# Patient Record
Sex: FEMALE | Race: WHITE | NOT HISPANIC OR LATINO | Employment: FULL TIME | URBAN - METROPOLITAN AREA
[De-identification: names, ages, dates, MRNs, and addresses within clinical notes are randomized per-mention and may not be internally consistent; named-entity substitution may affect disease eponyms.]

---

## 2017-06-13 ENCOUNTER — TRANSCRIBE ORDERS (OUTPATIENT)
Dept: ADMINISTRATIVE | Facility: HOSPITAL | Age: 52
End: 2017-06-13

## 2017-06-13 DIAGNOSIS — Z12.31 VISIT FOR SCREENING MAMMOGRAM: Primary | ICD-10-CM

## 2017-06-22 ENCOUNTER — HOSPITAL ENCOUNTER (OUTPATIENT)
Dept: MAMMOGRAPHY | Facility: HOSPITAL | Age: 52
Discharge: HOME | End: 2017-06-22
Admitting: FAMILY MEDICINE

## 2017-06-22 DIAGNOSIS — Z12.31 VISIT FOR SCREENING MAMMOGRAM: ICD-10-CM

## 2017-06-22 PROCEDURE — 77063 BREAST TOMOSYNTHESIS BI: CPT

## 2017-06-22 PROCEDURE — G0202 SCR MAMMO BI INCL CAD: HCPCS

## 2018-06-14 ENCOUNTER — TRANSCRIBE ORDERS (OUTPATIENT)
Dept: ADMINISTRATIVE | Facility: HOSPITAL | Age: 53
End: 2018-06-14

## 2018-06-14 DIAGNOSIS — Z12.31 SCREENING MAMMOGRAM, ENCOUNTER FOR: Primary | ICD-10-CM

## 2018-06-25 ENCOUNTER — HOSPITAL ENCOUNTER (OUTPATIENT)
Dept: MAMMOGRAPHY | Facility: HOSPITAL | Age: 53
Discharge: HOME | End: 2018-06-25
Admitting: FAMILY MEDICINE

## 2018-06-25 DIAGNOSIS — Z12.31 SCREENING MAMMOGRAM, ENCOUNTER FOR: ICD-10-CM

## 2018-06-25 PROCEDURE — 77063 BREAST TOMOSYNTHESIS BI: CPT

## 2018-06-25 PROCEDURE — 77067 SCR MAMMO BI INCL CAD: CPT

## 2018-07-11 ENCOUNTER — TRANSCRIBE ORDERS (OUTPATIENT)
Dept: ADMINISTRATIVE | Facility: HOSPITAL | Age: 53
End: 2018-07-11

## 2018-07-11 DIAGNOSIS — N64.89 BREAST ASYMMETRY: Primary | ICD-10-CM

## 2018-07-16 ENCOUNTER — APPOINTMENT (OUTPATIENT)
Dept: MAMMOGRAPHY | Facility: HOSPITAL | Age: 53
End: 2018-07-16

## 2018-07-17 ENCOUNTER — HOSPITAL ENCOUNTER (OUTPATIENT)
Dept: MAMMOGRAPHY | Facility: HOSPITAL | Age: 53
Discharge: HOME | End: 2018-07-17
Admitting: FAMILY MEDICINE

## 2018-07-17 DIAGNOSIS — N64.89 BREAST ASYMMETRY: ICD-10-CM

## 2018-07-17 PROCEDURE — 77065 DX MAMMO INCL CAD UNI: CPT

## 2019-05-29 ENCOUNTER — TRANSCRIBE ORDERS (OUTPATIENT)
Dept: ADMINISTRATIVE | Facility: HOSPITAL | Age: 54
End: 2019-05-29

## 2019-05-29 DIAGNOSIS — Z12.39 BREAST CANCER SCREENING: Primary | ICD-10-CM

## 2019-06-26 ENCOUNTER — HOSPITAL ENCOUNTER (OUTPATIENT)
Dept: MAMMOGRAPHY | Facility: HOSPITAL | Age: 54
Discharge: HOME | End: 2019-06-26
Admitting: FAMILY MEDICINE

## 2019-06-26 DIAGNOSIS — Z12.39 BREAST CANCER SCREENING: ICD-10-CM

## 2019-06-26 PROCEDURE — 77063 BREAST TOMOSYNTHESIS BI: CPT

## 2019-06-26 PROCEDURE — 77067 SCR MAMMO BI INCL CAD: CPT

## 2020-06-04 ENCOUNTER — TRANSCRIBE ORDERS (OUTPATIENT)
Dept: ADMINISTRATIVE | Facility: HOSPITAL | Age: 55
End: 2020-06-04

## 2020-06-04 DIAGNOSIS — Z12.31 SCREENING MAMMOGRAM, ENCOUNTER FOR: Primary | ICD-10-CM

## 2020-07-14 ENCOUNTER — APPOINTMENT (OUTPATIENT)
Dept: MAMMOGRAPHY | Facility: HOSPITAL | Age: 55
End: 2020-07-14

## 2020-07-16 ENCOUNTER — HOSPITAL ENCOUNTER (OUTPATIENT)
Dept: MAMMOGRAPHY | Facility: HOSPITAL | Age: 55
Discharge: HOME | End: 2020-07-16
Admitting: FAMILY MEDICINE

## 2020-07-16 DIAGNOSIS — Z12.31 SCREENING MAMMOGRAM, ENCOUNTER FOR: ICD-10-CM

## 2020-07-16 PROCEDURE — 77063 BREAST TOMOSYNTHESIS BI: CPT

## 2020-07-16 PROCEDURE — 77067 SCR MAMMO BI INCL CAD: CPT

## 2020-10-22 ENCOUNTER — TRANSCRIBE ORDERS (OUTPATIENT)
Dept: ADMINISTRATIVE | Facility: HOSPITAL | Age: 55
End: 2020-10-22

## 2020-10-22 DIAGNOSIS — I70.213 ATHEROSCLEROSIS OF NATIVE ARTERY OF BOTH LOWER EXTREMITIES WITH INTERMITTENT CLAUDICATION (HCC): Primary | ICD-10-CM

## 2020-10-30 ENCOUNTER — HOSPITAL ENCOUNTER (OUTPATIENT)
Dept: CARDIOLOGY | Facility: HOSPITAL | Age: 55
Discharge: HOME | End: 2020-10-30
Admitting: FAMILY MEDICINE

## 2020-10-30 DIAGNOSIS — I70.213 ATHEROSCLEROSIS OF NATIVE ARTERY OF BOTH LOWER EXTREMITIES WITH INTERMITTENT CLAUDICATION (HCC): ICD-10-CM

## 2020-10-30 LAB
BH CV LOWER ARTERIAL LEFT ABI RATIO: 1.2
BH CV LOWER ARTERIAL LEFT DORSALIS PEDIS SYS MAX: 109 MMHG
BH CV LOWER ARTERIAL LEFT GREAT TOE SYS MAX: 89 MMHG
BH CV LOWER ARTERIAL LEFT POST TIBIAL SYS MAX: 105 MMHG
BH CV LOWER ARTERIAL LEFT TBI RATIO: 1
BH CV LOWER ARTERIAL RIGHT ABI RATIO: 1.3
BH CV LOWER ARTERIAL RIGHT DORSALIS PEDIS SYS MAX: 117 MMHG
BH CV LOWER ARTERIAL RIGHT GREAT TOE SYS MAX: 86 MMHG
BH CV LOWER ARTERIAL RIGHT POST TIBIAL SYS MAX: 111 MMHG
BH CV LOWER ARTERIAL RIGHT TBI RATIO: 0.97
UPPER ARTERIAL LEFT ARM BRACHIAL SYS MAX: 89 MMHG
UPPER ARTERIAL RIGHT ARM BRACHIAL SYS MAX: 80 MMHG

## 2020-10-30 PROCEDURE — 93922 UPR/L XTREMITY ART 2 LEVELS: CPT

## 2021-05-26 ENCOUNTER — TRANSCRIBE ORDERS (OUTPATIENT)
Dept: ADMINISTRATIVE | Facility: HOSPITAL | Age: 56
End: 2021-05-26

## 2021-05-26 DIAGNOSIS — Z12.39 BREAST SCREENING: Primary | ICD-10-CM

## 2021-07-19 ENCOUNTER — HOSPITAL ENCOUNTER (OUTPATIENT)
Dept: MAMMOGRAPHY | Facility: HOSPITAL | Age: 56
Discharge: HOME | End: 2021-07-19
Admitting: FAMILY MEDICINE

## 2021-07-19 DIAGNOSIS — Z12.39 BREAST SCREENING: ICD-10-CM

## 2021-07-19 PROCEDURE — 77067 SCR MAMMO BI INCL CAD: CPT

## 2021-07-19 PROCEDURE — 77063 BREAST TOMOSYNTHESIS BI: CPT

## 2022-06-20 ENCOUNTER — TRANSCRIBE ORDERS (OUTPATIENT)
Dept: ADMINISTRATIVE | Facility: HOSPITAL | Age: 57
End: 2022-06-20

## 2022-06-20 DIAGNOSIS — Z12.31 SCREENING MAMMOGRAM FOR BREAST CANCER: Primary | ICD-10-CM

## 2022-07-21 ENCOUNTER — HOSPITAL ENCOUNTER (OUTPATIENT)
Dept: MAMMOGRAPHY | Facility: HOSPITAL | Age: 57
Discharge: HOME | End: 2022-07-21
Admitting: FAMILY MEDICINE

## 2022-07-21 DIAGNOSIS — Z12.31 SCREENING MAMMOGRAM FOR BREAST CANCER: ICD-10-CM

## 2022-07-21 PROCEDURE — 77067 SCR MAMMO BI INCL CAD: CPT

## 2022-07-21 PROCEDURE — 77063 BREAST TOMOSYNTHESIS BI: CPT

## 2023-06-05 ENCOUNTER — OFFICE VISIT (OUTPATIENT)
Dept: FAMILY MEDICINE CLINIC | Facility: CLINIC | Age: 58
End: 2023-06-05
Payer: COMMERCIAL

## 2023-06-05 ENCOUNTER — HOSPITAL ENCOUNTER (OUTPATIENT)
Dept: GENERAL RADIOLOGY | Facility: HOSPITAL | Age: 58
Discharge: HOME | End: 2023-06-05
Admitting: FAMILY MEDICINE
Payer: COMMERCIAL

## 2023-06-05 ENCOUNTER — TRANSCRIBE ORDERS (OUTPATIENT)
Dept: ADMINISTRATIVE | Facility: HOSPITAL | Age: 58
End: 2023-06-05
Payer: COMMERCIAL

## 2023-06-05 VITALS
TEMPERATURE: 98.7 F | DIASTOLIC BLOOD PRESSURE: 72 MMHG | OXYGEN SATURATION: 99 % | HEIGHT: 61 IN | BODY MASS INDEX: 21.07 KG/M2 | SYSTOLIC BLOOD PRESSURE: 112 MMHG | WEIGHT: 111.6 LBS | HEART RATE: 65 BPM

## 2023-06-05 DIAGNOSIS — M79.661 PAIN OF RIGHT LOWER LEG: Primary | ICD-10-CM

## 2023-06-05 DIAGNOSIS — Z12.31 VISIT FOR SCREENING MAMMOGRAM: Primary | ICD-10-CM

## 2023-06-05 DIAGNOSIS — M79.661 PAIN OF RIGHT LOWER LEG: ICD-10-CM

## 2023-06-05 PROCEDURE — 73590 X-RAY EXAM OF LOWER LEG: CPT

## 2023-06-05 RX ORDER — DIPHENOXYLATE HYDROCHLORIDE AND ATROPINE SULFATE 2.5; .025 MG/1; MG/1
1 TABLET ORAL DAILY
COMMUNITY

## 2023-06-05 NOTE — PROGRESS NOTES
"Chief Complaint  Leg Pain (Calf pain, has had for years, but is getting worse to where she can't walk )    Subjective    History of Present Illness {CC  Problem List  Visit  Diagnosis   Encounters  Notes  Medications  Labs  Result Review Imaging  Media :23}     Charmaine Pierre presents to [facility] for Leg Pain (Calf pain, has had for years, but is getting worse to where she can't walk ).  History of Present Illness     She reports a history of intermittent right leg/calf pain for years. There was no known injury. The past month her symptoms have been more severe. It has impeeded her exercise and general activity. She was recently on vacation and was unable to walk due to pain. She previously played tennis 5 times a week and now is not able due to pain. It generally improves some with rest and naproxen. A few years ago she had an EMG and NCS that was normal at that time.     Objective     Vital Signs:   /72 (BP Location: Right arm, Patient Position: Sitting, Cuff Size: Small Adult)   Pulse 65   Temp 98.7 °F (37.1 °C) (Oral)   Ht 153.7 cm (60.5\")   Wt 50.6 kg (111 lb 9.6 oz)   SpO2 99%   BMI 21.44 kg/m²   Body mass index is 21.44 kg/m².     Physical Exam  Constitutional:       General: She is not in acute distress.     Appearance: Normal appearance.   Musculoskeletal:         General: No swelling (negative liam's sign).   Neurological:      General: No focal deficit present.        Result Review  Data Reviewed:{ Labs  Result Review  Imaging  Med Tab  Media :23}                Assessment and Plan {CC Problem List  Visit Diagnosis  ROS  Review (Popup)  Health Maintenance  Quality  BestPractice  Medications  SmartSets  SnapShot Encounters  Media :23}   Diagnoses and all orders for this visit:    1. Pain of right lower leg (Primary)  -     XR Tibia Fibula 2 View Right (In Office); Future        Patient Instructions   Check xray of right lower leg. "   Continue heat, stretching and massage.   If xray is normal, consider spine imaging/PT/ortho        Patient was given instructions and counseling regarding her condition or for health maintenance advice on the AVS.       No follow-ups on file.    Ilene Garcia MD

## 2023-06-06 DIAGNOSIS — M79.661 PAIN OF RIGHT LOWER LEG: Primary | ICD-10-CM

## 2023-07-27 ENCOUNTER — HOSPITAL ENCOUNTER (OUTPATIENT)
Dept: MAMMOGRAPHY | Facility: HOSPITAL | Age: 58
Discharge: HOME | End: 2023-07-27
Admitting: FAMILY MEDICINE
Payer: COMMERCIAL

## 2023-07-27 DIAGNOSIS — Z12.31 VISIT FOR SCREENING MAMMOGRAM: ICD-10-CM

## 2023-07-27 PROCEDURE — 77067 SCR MAMMO BI INCL CAD: CPT

## 2023-07-27 PROCEDURE — 77063 BREAST TOMOSYNTHESIS BI: CPT

## 2023-09-18 ENCOUNTER — OFFICE VISIT (OUTPATIENT)
Dept: FAMILY MEDICINE CLINIC | Facility: CLINIC | Age: 58
End: 2023-09-18
Payer: COMMERCIAL

## 2023-09-18 VITALS
OXYGEN SATURATION: 99 % | HEIGHT: 61 IN | RESPIRATION RATE: 16 BRPM | DIASTOLIC BLOOD PRESSURE: 65 MMHG | WEIGHT: 111.5 LBS | BODY MASS INDEX: 21.05 KG/M2 | SYSTOLIC BLOOD PRESSURE: 99 MMHG

## 2023-09-18 DIAGNOSIS — M79.661 BILATERAL CALF PAIN: ICD-10-CM

## 2023-09-18 DIAGNOSIS — Z23 ENCOUNTER FOR VACCINATION: ICD-10-CM

## 2023-09-18 DIAGNOSIS — Z00.00 ANNUAL PHYSICAL EXAM: Primary | ICD-10-CM

## 2023-09-18 DIAGNOSIS — Z13.220 NEED FOR LIPID SCREENING: ICD-10-CM

## 2023-09-18 DIAGNOSIS — Z11.59 ENCOUNTER FOR HEPATITIS C SCREENING TEST FOR LOW RISK PATIENT: ICD-10-CM

## 2023-09-18 DIAGNOSIS — M79.662 BILATERAL CALF PAIN: ICD-10-CM

## 2023-09-18 PROBLEM — N95.1 MENOPAUSAL AND FEMALE CLIMACTERIC STATES: Status: ACTIVE | Noted: 2023-09-18

## 2023-09-18 PROCEDURE — 99396 PREV VISIT EST AGE 40-64: CPT | Performed by: FAMILY MEDICINE

## 2023-09-18 NOTE — PROGRESS NOTES
"Chief Complaint  Annual Exam    Subjective    {CC  Problem List  Visit  Diagnosis   Encounters  Notes  Medications  Labs  Result Review Imaging  Media :23}     Charmaine Pierre presents to [facility] for Annual Exam.    History of Present Illness     Annual Exam    Last pap smear: 8/23/2021 with HPV negative  Last mammogram: 7/27/2023  DEXA: none  Last colonoscopy: 11/12/2018  Ever screened for Hepatitis C: no  Vaccines: UTD   Exercise: twice weekly tennis, walking 3-4 times a week and yoga 5 times a week  Smoking status: never   Alcohol use: alcohol two days a week and has two glasses of wine  Sunscreen use: yes    Has had a lot of issues with her left calf.  It has come and gone many times in the past and massage is the most helpful intervention.  She had imaging done after a bad flare this summer that had her bedridden.  She's happy with the results .    Long term relationship with 3 grown children.  She is an  at .  She recently did a sabbatical in [place].      Mom is alive and well at 89.      She's watching her cholesterol and here for follow up on that.     Review of Systems     Objective       Vital Signs:   BP 99/65 (BP Location: Left arm, Patient Position: Sitting, Cuff Size: Adult)   Resp 16   Ht 153.7 cm (60.5\")   Wt 50.6 kg (111 lb 8 oz)   SpO2 99%   BMI 21.42 kg/m²     Body mass index is 21.42 kg/m².       PHQ-9 Total Score:       Physical Exam  Constitutional:       General: She is not in acute distress.     Appearance: Normal appearance.   HENT:      Head: Normocephalic and atraumatic.      Nose: Nose normal.   Eyes:      Conjunctiva/sclera: Conjunctivae normal.      Pupils: Pupils are equal, round, and reactive to light.   Cardiovascular:      Rate and Rhythm: Normal rate and regular rhythm.      Heart sounds: Normal heart sounds.   Pulmonary:      Effort: Pulmonary effort is normal.      Breath sounds: Normal breath sounds.   Abdominal:      " General: Bowel sounds are normal.      Palpations: Abdomen is soft.   Musculoskeletal:      Cervical back: Neck supple.   Skin:     General: Skin is warm.      Comments: Diffuse freckling on her back   Neurological:      General: No focal deficit present.      Mental Status: She is alert.      Gait: Gait normal.   Psychiatric:         Behavior: Behavior normal.        Result Review  Data Reviewed:{ Labs  Result Review  Imaging  Med Tab  Media :23}       Discussed healthy diet, exercise, adequate sleep, cancer screening, immunizations and preventative care. Annual eye exam and routine dental cleaning encouraged.        Assessment and Plan {CC Problem List  Visit Diagnosis  ROS  Review (Popup)  [facility] Maintenance  Quality  BestPractice  Medications  SmartSets  SnapShot Encounters  Media :23}   Diagnoses and all orders for this visit:    1. Annual physical exam (Primary)    2. Bilateral calf pain  Assessment & Plan:  Managed by regular massage and energy work with normal imaging.     Orders:  -     Ambulatory Referral to Massage Therapy    3. Need for lipid screening  -     Comprehensive Metabolic Panel  -     Lipid Panel    4. Encounter for hepatitis C screening test for low risk patient  -     Hepatitis C Antibody    5. Encounter for vaccination  Comments:  Get your flu shot at the pharmacy since you're traveling.        Patient Instructions   Get your flu shot and covid booster if possible before your trip.  Keep up your healthy lifestyle.  I have ordered lab tests today.  You should receive a phone call or a [employer] message with those results.  If you have not heard from us in 7-10 days, please call the office.         No follow-ups on file.    Genny De Leon MD

## 2023-09-18 NOTE — PATIENT INSTRUCTIONS
Get your flu shot and covid booster if possible before your trip.  Keep up your healthy lifestyle.  I have ordered lab tests today.  You should receive a phone call or a MyChart message with those results.  If you have not heard from us in 7-10 days, please call the office.

## 2023-09-19 LAB
ALBUMIN SERPL-MCNC: 4.8 G/DL (ref 3.8–4.9)
ALBUMIN/GLOB SERPL: 2.1 {RATIO} (ref 1.2–2.2)
ALP SERPL-CCNC: 97 IU/L (ref 44–121)
ALT SERPL-CCNC: 16 IU/L (ref 0–32)
AST SERPL-CCNC: 24 IU/L (ref 0–40)
BILIRUB SERPL-MCNC: 0.6 MG/DL (ref 0–1.2)
BUN SERPL-MCNC: 15 MG/DL (ref 6–24)
BUN/CREAT SERPL: 23 (ref 9–23)
CALCIUM SERPL-MCNC: 10 MG/DL (ref 8.7–10.2)
CHLORIDE SERPL-SCNC: 101 MMOL/L (ref 96–106)
CHOLEST SERPL-MCNC: 238 MG/DL (ref 100–199)
CO2 SERPL-SCNC: 19 MMOL/L (ref 20–29)
CREAT SERPL-MCNC: 0.66 MG/DL (ref 0.57–1)
EGFRCR SERPLBLD CKD-EPI 2021: 104 ML/MIN/1.73
GLOBULIN SER CALC-MCNC: 2.3 G/DL (ref 1.5–4.5)
GLUCOSE SERPL-MCNC: 79 MG/DL (ref 70–99)
HCV IGG SERPL QL IA: NON REACTIVE
HDLC SERPL-MCNC: 63 MG/DL
LDLC SERPL CALC-MCNC: 156 MG/DL (ref 0–99)
POTASSIUM SERPL-SCNC: 4 MMOL/L (ref 3.5–5.2)
PROT SERPL-MCNC: 7.1 G/DL (ref 6–8.5)
SODIUM SERPL-SCNC: 140 MMOL/L (ref 134–144)
TRIGL SERPL-MCNC: 110 MG/DL (ref 0–149)
VLDLC SERPL CALC-MCNC: 19 MG/DL (ref 5–40)

## 2024-06-07 ENCOUNTER — TRANSCRIBE ORDERS (OUTPATIENT)
Dept: ADMINISTRATIVE | Facility: HOSPITAL | Age: 59
End: 2024-06-07
Payer: COMMERCIAL

## 2024-06-07 DIAGNOSIS — Z12.31 SCREENING MAMMOGRAM, ENCOUNTER FOR: Primary | ICD-10-CM

## 2024-07-29 ENCOUNTER — HOSPITAL ENCOUNTER (OUTPATIENT)
Dept: MAMMOGRAPHY | Facility: HOSPITAL | Age: 59
Discharge: HOME | End: 2024-07-29
Admitting: FAMILY MEDICINE
Payer: COMMERCIAL

## 2024-07-29 DIAGNOSIS — Z12.31 SCREENING MAMMOGRAM, ENCOUNTER FOR: ICD-10-CM

## 2024-07-29 PROCEDURE — 77067 SCR MAMMO BI INCL CAD: CPT

## 2024-07-29 PROCEDURE — 77063 BREAST TOMOSYNTHESIS BI: CPT

## 2024-09-24 ENCOUNTER — OFFICE VISIT (OUTPATIENT)
Dept: FAMILY MEDICINE CLINIC | Facility: CLINIC | Age: 59
End: 2024-09-24
Payer: COMMERCIAL

## 2024-09-24 VITALS
DIASTOLIC BLOOD PRESSURE: 57 MMHG | BODY MASS INDEX: 20.58 KG/M2 | HEIGHT: 61 IN | WEIGHT: 109 LBS | RESPIRATION RATE: 18 BRPM | SYSTOLIC BLOOD PRESSURE: 89 MMHG | HEART RATE: 70 BPM | OXYGEN SATURATION: 99 %

## 2024-09-24 DIAGNOSIS — R53.83 PERSISTENT FATIGUE AFTER COVID-19: ICD-10-CM

## 2024-09-24 DIAGNOSIS — U09.9 PERSISTENT FATIGUE AFTER COVID-19: ICD-10-CM

## 2024-09-24 DIAGNOSIS — Z00.00 ANNUAL PHYSICAL EXAM: Primary | ICD-10-CM

## 2024-09-24 DIAGNOSIS — Z13.220 NEED FOR LIPID SCREENING: ICD-10-CM

## 2024-09-24 DIAGNOSIS — Z78.0 POSTMENOPAUSAL: ICD-10-CM

## 2024-09-24 DIAGNOSIS — M79.661 BILATERAL CALF PAIN: ICD-10-CM

## 2024-09-24 DIAGNOSIS — M79.662 BILATERAL CALF PAIN: ICD-10-CM

## 2024-09-24 PROCEDURE — 99396 PREV VISIT EST AGE 40-64: CPT | Performed by: FAMILY MEDICINE

## 2024-09-24 PROCEDURE — 99213 OFFICE O/P EST LOW 20 MIN: CPT | Performed by: FAMILY MEDICINE

## 2024-09-25 LAB
ALBUMIN SERPL-MCNC: 4.4 G/DL (ref 3.8–4.9)
ALP SERPL-CCNC: 86 IU/L (ref 44–121)
ALT SERPL-CCNC: 14 IU/L (ref 0–32)
AST SERPL-CCNC: 39 IU/L (ref 0–40)
BASOPHILS # BLD AUTO: 0.1 X10E3/UL (ref 0–0.2)
BASOPHILS NFR BLD AUTO: 1 %
BILIRUB SERPL-MCNC: 0.3 MG/DL (ref 0–1.2)
BUN SERPL-MCNC: 16 MG/DL (ref 6–24)
BUN/CREAT SERPL: 21 (ref 9–23)
CALCIUM SERPL-MCNC: 9.3 MG/DL (ref 8.7–10.2)
CHLORIDE SERPL-SCNC: 103 MMOL/L (ref 96–106)
CHOLEST SERPL-MCNC: 215 MG/DL (ref 100–199)
CO2 SERPL-SCNC: 18 MMOL/L (ref 20–29)
CREAT SERPL-MCNC: 0.76 MG/DL (ref 0.57–1)
EGFRCR SERPLBLD CKD-EPI 2021: 92 ML/MIN/1.73
EOSINOPHIL # BLD AUTO: 0.2 X10E3/UL (ref 0–0.4)
EOSINOPHIL NFR BLD AUTO: 4 %
ERYTHROCYTE [DISTWIDTH] IN BLOOD BY AUTOMATED COUNT: 11.7 % (ref 11.7–15.4)
GLOBULIN SER CALC-MCNC: 2.6 G/DL (ref 1.5–4.5)
GLUCOSE SERPL-MCNC: 75 MG/DL (ref 70–99)
HCT VFR BLD AUTO: 46.6 % (ref 34–46.6)
HDLC SERPL-MCNC: 60 MG/DL
HGB BLD-MCNC: 14.9 G/DL (ref 11.1–15.9)
IMM GRANULOCYTES # BLD AUTO: 0 X10E3/UL (ref 0–0.1)
IMM GRANULOCYTES NFR BLD AUTO: 0 %
LDLC SERPL CALC-MCNC: 138 MG/DL (ref 0–99)
LYMPHOCYTES # BLD AUTO: 1.7 X10E3/UL (ref 0.7–3.1)
LYMPHOCYTES NFR BLD AUTO: 33 %
MCH RBC QN AUTO: 30.9 PG (ref 26.6–33)
MCHC RBC AUTO-ENTMCNC: 32 G/DL (ref 31.5–35.7)
MCV RBC AUTO: 97 FL (ref 79–97)
MONOCYTES # BLD AUTO: 0.3 X10E3/UL (ref 0.1–0.9)
MONOCYTES NFR BLD AUTO: 7 %
NEUTROPHILS # BLD AUTO: 2.9 X10E3/UL (ref 1.4–7)
NEUTROPHILS NFR BLD AUTO: 55 %
PLATELET # BLD AUTO: 158 X10E3/UL (ref 150–450)
POTASSIUM SERPL-SCNC: ABNORMAL MMOL/L
PROT SERPL-MCNC: 7 G/DL (ref 6–8.5)
RBC # BLD AUTO: 4.82 X10E6/UL (ref 3.77–5.28)
SODIUM SERPL-SCNC: 140 MMOL/L (ref 134–144)
TRIGL SERPL-MCNC: 96 MG/DL (ref 0–149)
TSH SERPL DL<=0.005 MIU/L-ACNC: 2.27 UIU/ML (ref 0.45–4.5)
VIT B12 SERPL-MCNC: 1003 PG/ML (ref 232–1245)
VLDLC SERPL CALC-MCNC: 17 MG/DL (ref 5–40)
WBC # BLD AUTO: 5.1 X10E3/UL (ref 3.4–10.8)

## 2024-10-17 ENCOUNTER — OFFICE VISIT (OUTPATIENT)
Dept: SPORTS MEDICINE | Facility: CLINIC | Age: 59
End: 2024-10-17
Payer: COMMERCIAL

## 2024-10-17 VITALS
TEMPERATURE: 99.3 F | HEIGHT: 61 IN | BODY MASS INDEX: 20.58 KG/M2 | DIASTOLIC BLOOD PRESSURE: 75 MMHG | SYSTOLIC BLOOD PRESSURE: 109 MMHG | OXYGEN SATURATION: 98 % | WEIGHT: 109 LBS | HEART RATE: 87 BPM

## 2024-10-17 DIAGNOSIS — M79.662 BILATERAL CALF PAIN: Primary | ICD-10-CM

## 2024-10-17 DIAGNOSIS — M79.661 BILATERAL CALF PAIN: Primary | ICD-10-CM

## 2024-10-17 DIAGNOSIS — M25.562 ACUTE PAIN OF LEFT KNEE: ICD-10-CM

## 2024-10-17 NOTE — PROGRESS NOTES
"Charmaine is a 58 y.o. year old female here today for consultation requested by Genny De Leon     Chief Complaint   Patient presents with    Pain     Bilateral calf pain       History of Present Illness  Charmaine is a 58 y.o. year old female here today for bilateral lower leg pain that has been present for several years with recent worsening a couple months ago with no known injury or trauma.  History of Present Illness  She has been experiencing chronic leg pain for the past 8 years, describing it as a deep ache in her calves. She recalls an incident where she pulled a calf muscle, but she believes this was a consequence rather than the cause of her discomfort as she was having pain even before then. She recalls an episode during a trip to [place] where she was unable to walk after increased activity the day before jumping off rocks (though no injury). She managed her symptoms with calf compression sleeves and Tiger Balm. She will have these flare-up but overall feels like her pain has been progressively worsening. She now limits tennis from five times a week to twice a week. She also reports a sensation of her calves \"shriveling\" and occasional shin splints. Despite undergoing physical therapy, cupping, chiropractic adjustments, and dry needling, she has not found significant relief. She has tried using compression socks and Aleve for pain management. She feels like she has had the best results with massage and compression socks. Her pain intensifies with exercise and improves when she refrains from physical activity.    She also experiences stiffness in her hips and shoulders, which she attributes to aging and playing tennis, respectively. Additionally, she reports that her hands and feet often feel cold. She maintains an active lifestyle, including daily yoga and frequent walks.    She recently injured her left knee while playing tennis 3 weeks ago, which caused her to stop exercising for several weeks. Pain is " located more on the medial aspect and described the pain as more of a clicking sensation than a popping one. Denies any swelling or locking.    She broke her tailbone when she had one of her children.   at [facility]         The following data was reviewed by: Sharon Broderick DO on 10/17/2024:  Data reviewed : Primary Care note from 9/18/24, 6/5/23      Right Tib-Fib X-Ray from 6/5/23  Indication: Pain  AP and Lateral views  Findings:  No fracture  Small well-rounded ossicles distal to the tip of both the medial and lateral malleolus  Normal soft tissues  Normal joint spaces  No prior studies were available for comparison.         Results for orders placed during the hospital encounter of 06/26/23    MRI Lumbar Spine Without Contrast    Narrative  MRI EXAMINATION OF THE LUMBAR SPINE WITHOUT CONTRAST    HISTORY: Back pain, left radiculopathy.    COMPARISON: None.    FINDINGS: The alignment of the lumbar spine is within normal limits.  There is very mild disc desiccation with preservation of disc height  from L1 to L5. The conus is at L1 and the caudal aspect of the spinal  cord appears unremarkable.    L1-L2: There is no evidence of disc bulge or herniation.    L2-L3: There is no evidence of disc bulge or herniation.    L3-L4: Mild facet degenerative disease and a minimal central disc  osteophyte complex are present.    L4-L5: Mild facet degenerative disease and a mild central broad-based  disc osteophyte complex are appreciated. There is mild lateral recess  narrowing bilaterally, more prominent on the left. Minimal foraminal  stenosis is appreciated bilaterally secondary to extension of a small  disc osteophyte complex into the neural foramen.    L5-S1: There is no evidence of disc bulge or herniation.    Impression  Mild degenerative disease involving the lumbar spine is  noted as described above with no evidence of disc herniation. This  includes mild facet degenerative disease and a mild broad-based  "disc  osteophyte complex at L4-L5 which together contribute to mild lateral  recess narrowing bilaterally, more prominent on the left. See above.    This report was finalized on 6/27/2023 4:44 PM by Dr. Speedy Bryant M.D.        Noninvasive physiologic studies of upper/lower extremity arteries, single level, bilateral from 10/30/2020  Clinical Indication    CLOTICATION   Dx: Atherosclerosis of native artery of both lower extremities with intermittent claudication [I70.213 (ICD-10-CM)]     Interpretation Summary  Right Conclusion: The right NIKHIL is normal. Normal digital pressures.  Left Conclusion: The left NIKHIL is normal. Normal digital pressures.     Study Impression  Right Conclusion: The right NIKHIL is normal. Normal digital pressures.     Left Conclusion: The left NIKHIL is normal. Normal digital pressures.     Signed  Electronically signed by Derian Vargas MD on 10/30/20 at 0947 EDT         /75 (BP Location: Right arm, Patient Position: Sitting, Cuff Size: Adult)   Pulse 87   Temp 99.3 °F (37.4 °C) (Infrared)   Ht 153.7 cm (60.5\")   Wt 49.4 kg (109 lb)   SpO2 98%   BMI 20.94 kg/m²        Physical Exam  Vital signs reviewed.   General: Well developed, well nourished; No acute distress.  Eyes: conjunctiva clear; pupils equally round and reactive  ENT: external ears and nose atraumatic; hearing normal  CV: no peripheral edema, 2+ distal pulses  Resp: normal respiratory effort, no use of accessory muscles  Skin: normal color and pigmentation; no rashes or wounds; normal turgor  Psych: alert and oriented; mood and affect appropriate; recent and remote memory intact  Neuro: sensation to light touch intact    MSK Exam:  The bilateral legs are without obvious signs of acute bony deformity, muscle atrophy, swelling, erythema, ecchymosis or joint effusion. No tenderness at the ankle. Ankle ROM and strength is 5/5 and pain-free. Toes are cold with slightly delayed cap refill, though it is symmetrical. " Distal LE pulses 2+ and equal. Sensation is intact and equal. General soft tissue tenderness of the calves.    There is mild swelling at the left knee. The patella is without tenderness. Apprehension is negative with medial and lateral glide.  The medial joint line and MCL are tender. Lateral joint line is nontender and without bony crepitus or step-off. Flexion is limited on the left and painful at end range. Knee and hip strength is 4/5. Medial sided pain with valgus stress and Eber's. Varus stress, Lachman's, anterior drawer, and posterior drawer are all negative.     The lumbar spine without obvious signs of deformity, scoliosis, erythema, or ecchymosis. There is no midline lumbar tenderness or pelvic bony tenderness. There is soft tissue tenderness of the gluteus and piriformis musculature. Achilles and patellar reflexes are 2+ and equal. Sensation is intact to light touch. Straight leg raise is negative.       Left Knee X-Ray  Indication: Pain  Views: AP, Lateral, and [place]  Findings:  No fracture  No bony lesion  Normal soft tissues  Mild medial and PF narrowing and spurring  No prior studies were available for comparison.      Assessment and Plan  Diagnoses and all orders for this visit:    1. Bilateral calf pain (Primary)  -     Ambulatory Referral to Physical Therapy for Evaluation & Treatment    2. Acute pain of left knee  -     Ambulatory Referral to Physical Therapy for Evaluation & Treatment    Charmaine is a 58 y.o. year old female here today for bilateral lower leg pain that has been present for several years with recent worsening a couple months ago with no known injury or trauma. She also has acute left knee pain that has been present for 3 weeks.    Assessment & Plan  1. Chronic leg pain.  The symptoms suggest a muscular origin for her chronic leg pain. However, the MRI of her lower back revealed a disc exerting pressure on a nerve, which could potentially contribute to her discomfort.  Additionally, she exhibits signs of decreased capillary refill and cold extremities which could indicate a vascular cause. An MRI of the lower legs will be ordered to further investigate any potential muscle changes, but I do not see that changing our plans at this time. She is advised to wear compression stockings consistently throughout the day, especially during physical activities such as tennis, and to monitor any changes in her symptoms. A referral to a physical therapy (Joelle Magana) will be made. She is also advised to limit activities that exacerbate her pain.    2. Left knee pain.  The left knee pain could be due to underlying arthritis or meniscal damage. The presence of soreness along the joint line particularly when the leg is twisted, suggests possible meniscal damage. The x-ray results indicate mild arthritis in the knee, which is not a major concern at this point. She is advised to take Aleve consistently for 1 to 2 weeks to help manage the swelling. If she finds it helpful, she can use a brace and apply compression to alleviate the swelling. Elevating the knee while at home may also help reduce swelling. Gentle motion exercises are recommended, and she should avoid activities that cause discomfort.    Follow-up  Return in 6 to 8 weeks for follow up.  All of her questions were answered and she is agreeable with the plan.    Total time: 65 minutes. This includes time spent with the patient, but also time spent before the visit reviewing the chart and time after the visit documenting the visit, reviewing labs, imaging studies, etc. This is in addition to/separate from any documented procedures performed.       Dictated utilizing Dragon dictation.  Patient or patient representative verbalized consent for the use of Ambient Listening during the visit with  Sharon Broderick DO for chart documentation. 10/17/2024  09:11 EST

## 2024-10-31 ENCOUNTER — TREATMENT (OUTPATIENT)
Dept: PHYSICAL THERAPY | Facility: CLINIC | Age: 59
End: 2024-10-31
Payer: COMMERCIAL

## 2024-10-31 DIAGNOSIS — Z74.09 IMPAIRED FUNCTIONAL MOBILITY AND ACTIVITY TOLERANCE: ICD-10-CM

## 2024-10-31 DIAGNOSIS — M79.662 BILATERAL CALF PAIN: Primary | ICD-10-CM

## 2024-10-31 DIAGNOSIS — M25.562 ACUTE PAIN OF LEFT KNEE: ICD-10-CM

## 2024-10-31 DIAGNOSIS — M79.661 BILATERAL CALF PAIN: Primary | ICD-10-CM

## 2024-10-31 NOTE — PROGRESS NOTES
"[facility] [place]  [address], [place] [facility] [address]                                                                                   Phone [phone]                                                                                    Fax [phone]    Physical Therapy Initial Evaluation and Plan of Care    Patient: Charmaine Pierre   : 1965  Diagnosis/ICD-10 Code:  Bilateral calf pain [M79.661, M79.662]  Referring practitioner: Sharon Broderick DO  NPI: [id number]                                      Date of Initial Visit: 10/31/2024  Today's Date: 2024  Patient seen for 1 session         Visit Diagnoses:    ICD-10-CM ICD-9-CM   1. Bilateral calf pain  M79.661 729.5    M79.662    2. Acute pain of left knee  M25.562 719.46   3. Impaired functional mobility and activity tolerance  Z74.09 V49.89         Subjective Questionnaire: LEFS: 3880      Subjective Evaluation    History of Present Illness  Mechanism of injury: I'm here for my calf issues but recently tweaked my L knee.  About a 10 year gradual onset of calf pain; gets regular massages and therapist describes muscles as roping.  I like to be active and it's limiting what I can tolerate.  More tightness vs pain because I haven't been using it.  No N/T in legs.      L knee strain 6 weeks; noticed while playing tennis.  Tried to play again and that wasn't a good idea so hasn't played for 6 weeks.  + sleep disturbance.  Knee feels vulnerable/unstable but no giving way/locking.  Knee pain is constant    Broke tailbone during childbirth 26 years ago; chronic misalignment.  - bowel/bladder.  2nd child was 10 lbs/10 ounces - traumatic birth.  Some of what she perceives as stress incontinence. All her births were vaginal     PLOF - I used to play tennis 5 days week; now down to 2 days week.  I try to walk 25 min/5 days but walks slowly than I would like; does \"yoga for injured knees\" in the morning.  I like to travel " but this issue has affected my ability to tolerate walking.      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 This  includes mild facet degenerative disease and a mild broad-based disc  osteophyte complex at L4-L5 which together contribute to mild lateral  recess narrowing bilaterally, more prominent on the left.              Patient Occupation: professor Pain  Current pain rating: 3  At worst pain ratin (knee pain initially)  Location: medial patella  Quality: dull ache and tight  Relieving factors: rest (compression socks, Aleve in am/Tylenol at night, witch hazel)  Aggravating factors: ambulation, stairs and sleeping (power walking, sitting/sit to stand, pivoting)    Social Support  Lives in: multiple-level home  Lives with: alone    Diagnostic Tests  X-ray: normal  EMG: normal    Patient Goals  Patient goals for therapy: decreased pain and return to sport/leisure activities         Patient Active Problem List    Diagnosis Date Noted    Bilateral calf pain 2023    Menopausal and female climacteric states 2023     Past Medical History:   Diagnosis Date    Menopausal and female climacteric states      Past Surgical History:   Procedure Laterality Date    BREAST BIOPSY      COLONOSCOPY  2018    MAMMO BILATERAL  2020    PAP SMEAR  2021         Objective          Palpation   Left   Hypertonic in the piriformis.   Tenderness of the lateral gastrocnemius, medial gastrocnemius and piriformis.     Right   Hypertonic in the piriformis.     Tenderness     Lumbar Spine  Tenderness in the spinous process (L34).     Left Hip   No tenderness in the PSIS.     Right Hip   No tenderness in the PSIS.   Left Knee   Tenderness in the pes anserinus. No tenderness in the medial patella and medial retinaculum.      Additional Tenderness Details  L ASIS inf/L isch tub sup    Active Range of Motion     Lumbar   Flexion: WFL  Left Knee   Flexion: 95 degrees with pain  Extension: 0 degrees     Right Knee   Flexion: 137 degrees   Extension: 0 degrees     Additional Active Range of Motion Details  No change in symptoms with repetitive flex/ext  Sacral torsion with trunk flex/ext    Strength/Myotome Testing     Left Hip   Planes of Motion   Flexion: 4+  Extension: 4  Abduction: 5  Adduction: 4    Right Hip   Planes of Motion   Flexion: 4+  Extension: 4+  Abduction: 5  Adduction: 4    Left Knee   Flexion: 4  Extension: 4  Quadriceps contraction: good    Right Knee   Flexion: 5  Extension: 5    Muscle Activation     Additional Muscle Activation Details  BeActivated 123 testing    Psoas MMT tested supine in slight abd/hip ER R 4/5 L 4/5                               with ankle PF(quad) R 4+/5 L4+/5                                with ankle DF (ant tib) R 5/5 L 5/5                                         Tests     Left Hip   Positive long sit and Rain.     Right Hip   Positive Rain.   Negative long sit.     Left Knee   Negative bounce home, lateral Eber, medial Eber, valgus stress test at 0 degrees and valgus stress test at 30 degrees.     Ambulation   Weight-Bearing Status   Assistive device used: none    Ambulation: Stairs   Ascend stairs: independent  Pattern: reciprocal  Railings: without rails  Descend stairs: independent  Pattern: reciprocal  Railings: without rails    Observational Gait   Gait: antalgic   Decreased left stance time.     Functional Assessment     Comments  Able to perform 10 heel raises with some calf pain          Assessment & Plan       Assessment  Impairments: abnormal gait, abnormal or restricted ROM, activity intolerance, impaired physical strength, lacks appropriate home exercise program and pain with function   Other impairment: L anterior inominate  Functional limitations: sleeping, walking,  uncomfortable because of pain, standing and stooping   Assessment details: Charmaine Pierre is a 58 y.o. female referred to physical therapy for B calf pain and acute L knee pain. She presents with a evolving clinical presentation. She has comorbidities of sacral fracture in childbirth with long history of iliosacral malalignment and personal factors of active lifestyle that may affect her progress in the plan of care. Signs and symptoms are consistent with physical therapy diagnosis of impaired functional mobility and activity tolerance. Pt was educated on course of treatment, possible reasons for pain, activity modifications, possible impacts of pelvic malalignment impacting pain complaints/compensation patterns and use of ice PRN. Pt was given a copy of HEP. Patient is appropriate for skilled physical therapy in order to reduce pain and increase ease with daily mobility and resume active lifestyle.       Prognosis: good    Goals  Plan Goals: STG In 4 weeks  1. Pt to exhibit compliance/independence with HEP without exacerbation of symptoms.  2. Pt to exhibit increased L knee flex AROM to 135 to allow for improved ability to perform daily yoga practice.  3.  Patient to ambulate independently community distances with normal elena and symmetry  4. Patient will be independent with education for symptom management, joint protection and strategies to minimize stress on affected tissues    LTG In 8 weeks  1. Pt to exhibit symmetrical iliosacral alignment on 2 consecutive appointments to allow improved tolerance to fitness walking  2. LEFS >/= 60/80 to demonstrate improved functional tolerance to ADLs  3. Pt to exhibit 5/5 strength throughout hip and knee musculature to allow for normalized gait and standing > 10 min.  4. Pt is able to resume tennis at least 2 days/week without functional limitation and acceptable symptoms.   5. Pt is able to ambulate for at least 25 min/5 days a week in her neighborhood without pain >  2/10     Plan  Therapy options: will be seen for skilled therapy services  Planned modality interventions: cryotherapy, dry needling, ultrasound and TENS  Planned therapy interventions: neuromuscular re-education, therapeutic activities, strengthening, stretching, home exercise program, balance/weight-bearing training, manual therapy and flexibility  Frequency: 1x week  Duration in weeks: 6  Treatment plan discussed with: patient  Plan details: Access Code: [email or web address]  URL: [email or web address]  Date: 10/31/2024  Prepared by: Alma Rosa Magana    Exercises  - Seated Slump Nerve Glide  - 2 x daily - 1 sets - 10 reps - 5 hold  - Supine Hip Adduction Isometric with Ball  - 1 x daily - 7 x weekly - 1 sets - 10 reps - 5 hold  - Hooklying Clamshell with Resistance  - 1 x daily - 1 sets - 10 reps - 3 hold  - Supine Active Straight Leg Raise (Mirrored)  - 2 x daily - 7 x weekly - 1 sets - 10 reps - 3 hold  - Straight Leg Raise with External Rotation (Mirrored)  - 1 x daily - 7 x weekly - 1 sets - 10 reps - 3 hold  - Supine Heel Slide with Strap  - 1 x daily - 7 x weekly - 1 sets - 10 reps - 5 hold    Patient was educated on findings of evaluation, purpose of treatment, and goals for therapy.  Treatment options discussed and questions answered.  Patient was educated on exercises/self treatment/pain relief techniques.           History # of Personal Factors and/or Comorbidities: MODERATE (1-2)  Examination of Body System(s): # of elements: HIGH (4+)  Clinical Presentation: EVOLVING  Clinical Decision Making: MODERATE      Timed:         Manual Therapy:         mins  57239;     Therapeutic Exercise:   15      mins  36970;     Neuromuscular Letha:        mins  45966;    Therapeutic Activity:    10      mins  13162;     Gait Training:           mins  07693;     Ultrasound:          mins  28472;    Ionto                                   mins   16284  Self Care                            mins    41700    Un-Timed:  Electrical Stimulation:         mins  35625 ( );  Dry Needling   (1-2 muscles)       mins 20560 (Self-pay)  Dry Needling (3-4 muscles)        mins 20561 (Self-pay)  Dry Needling Trial          mins DRYNDLTRIAL  (No Charge)  Traction          mins 16892  Low Eval          Mins  11861  Mod Eval     25     Mins  14723  High Eval                            Mins  38018    Timed Treatment:  25    mins   Total Treatment:    60    mins    PT: Alma Rosa Magana PT     [place] License # [id number]    Electronically signed by Alma Rosa Magana PT, 11/01/24, 7:32 AM EDT    Certification Period: 11/1/2024 thru 1/29/2025  I certify that the therapy services are furnished while this patient is under my care.  The services outlined above are required by this patient, and will be reviewed every 90 days.         Physician Signature:__________________________________________________    PHYSICIAN: Sharon Broderick DO  NPI: [id number]                                      DATE:  :     Please sign and return via fax to [phone].  Thank you, [facility]

## 2024-11-07 ENCOUNTER — TREATMENT (OUTPATIENT)
Dept: PHYSICAL THERAPY | Facility: CLINIC | Age: 59
End: 2024-11-07
Payer: COMMERCIAL

## 2024-11-07 DIAGNOSIS — M79.661 BILATERAL CALF PAIN: Primary | ICD-10-CM

## 2024-11-07 DIAGNOSIS — Z74.09 IMPAIRED FUNCTIONAL MOBILITY AND ACTIVITY TOLERANCE: ICD-10-CM

## 2024-11-07 DIAGNOSIS — M25.562 ACUTE PAIN OF LEFT KNEE: ICD-10-CM

## 2024-11-07 DIAGNOSIS — M79.662 BILATERAL CALF PAIN: Primary | ICD-10-CM

## 2024-11-21 ENCOUNTER — TREATMENT (OUTPATIENT)
Dept: PHYSICAL THERAPY | Facility: CLINIC | Age: 59
End: 2024-11-21
Payer: COMMERCIAL

## 2024-11-21 DIAGNOSIS — M79.662 BILATERAL CALF PAIN: Primary | ICD-10-CM

## 2024-11-21 DIAGNOSIS — Z74.09 IMPAIRED FUNCTIONAL MOBILITY AND ACTIVITY TOLERANCE: ICD-10-CM

## 2024-11-21 DIAGNOSIS — M79.661 BILATERAL CALF PAIN: Primary | ICD-10-CM

## 2024-11-21 DIAGNOSIS — M25.562 ACUTE PAIN OF LEFT KNEE: ICD-10-CM

## 2024-11-21 NOTE — PROGRESS NOTES
"    Physical Therapy Daily Treatment Note      Patient: Charmaine Pierre   : 1965  Referring practitioner: Sharon Broderick DO  Date of Initial Visit: Type: THERAPY  Noted: 10/31/2024  Today's Date: 2024  Patient seen for 3 sessions       Visit Diagnoses:    ICD-10-CM ICD-9-CM   1. Bilateral calf pain  M79.661 729.5    M79.662    2. Acute pain of left knee  M25.562 719.46   3. Impaired functional mobility and activity tolerance  Z74.09 V49.89       Subjective   Charmaine Pierre reports: feeling much better; still having some L knee \"instability\" with pivoting.  Able to resume 20 min walks without issue. Hasn't really tested my body to have gotten symptoms into my calves.     Objective   See Exercise, Manual, and Modality Logs for complete treatment.       Assessment/Plan   Charmaine is responding favorably to current program with improved walking tolerance.  She was able to advance her exercises with emphasis on proximal core/hip  stability.  She was challenged but able to perform with good technique. Pt was issued updated HEP printout to facilitate compliance and recall with ex progressions today.  We reviewed most important exercises to focus on.  Progress per Plan of Care  Follow up in a few weeks as she slowly resumes recreational activities.            Timed:         Manual Therapy:         mins  74793;     Therapeutic Exercise:   15      mins  09359;     Neuromuscular Letha:   8     mins  19647;    Therapeutic Activity:    15      mins  89296;     Gait Training:           mins  24841;     Ultrasound:          mins  34838;    Ionto                                   mins   24433  Self Care                            mins   10866      Timed Treatment:   38   mins   Total Treatment:    38    mins    VANI Gonzalez License: #[id number]           "

## 2024-12-13 ENCOUNTER — OFFICE VISIT (OUTPATIENT)
Dept: SPORTS MEDICINE | Facility: CLINIC | Age: 59
End: 2024-12-13
Payer: COMMERCIAL

## 2024-12-13 VITALS — WEIGHT: 109 LBS | HEIGHT: 61 IN | TEMPERATURE: 98.2 F | BODY MASS INDEX: 20.58 KG/M2

## 2024-12-13 DIAGNOSIS — S83.207D POSITIVE MCMURRAY TEST OF LEFT KNEE, SUBSEQUENT ENCOUNTER: ICD-10-CM

## 2024-12-13 DIAGNOSIS — M23.52 CHRONIC KNEE INSTABILITY, LEFT: ICD-10-CM

## 2024-12-13 DIAGNOSIS — M25.562 ACUTE PAIN OF LEFT KNEE: Primary | ICD-10-CM

## 2024-12-13 DIAGNOSIS — M25.562 MEDIAL JOINT LINE TENDERNESS OF KNEE, LEFT: ICD-10-CM

## 2024-12-13 RX ORDER — ERGOCALCIFEROL (VITAMIN D2) 10 MCG
400 TABLET ORAL DAILY
COMMUNITY

## 2024-12-13 NOTE — PROGRESS NOTES
Chief Complaint   Patient presents with    Left Knee - Follow-up     Left knee 36 hours ago tweaked knee again has been very painful since then - using aleve and ice        History of Present Illness  Charmaine is a 59 y.o. year old female here today for follow-up of bilateral lower leg pain that has been present for several years with recent worsening a couple months ago with no known injury or trauma. Evaluation with x-rays of the tib-fib and NIKHIL have been unremarkable. Initial history and exam seemed like symptoms were more muscular in nature with possible claudication contributing to symptoms as well. I recommended conservative management with physical therapy and regular use of compression stockings.    She is also here to follow-up acute left knee pain that has been present since the end of September during a tennis injury.  Initial x-rays showed mild medial and PF narrowing and spurring, but no acute findings.  Exam concerning for possible meniscus injury.  I recommended trial of conservative management.    Please see previous notes for complete history and treatment course. She returns today with persistent symptoms, specifically left knee pain.  History of Present Illness  She reports that her condition was stable until approximately 36 hours ago when she experienced a sudden onset of severe left knee pain while performing a minor task at home. The incident involved using her toe to move an object at the top of the stairs, during which she felt a pop in her knee. This was not associated with any twisting or extension of the knee. The pain was so intense that it disrupted her sleep, even after taking two Aleve tablets. She sought relief by sleeping on the couch. The following day, she received a massage that helped some. She has been abstaining from tennis for over 100 days due to her knee condition. She also reports difficulty in standing on one leg and a sensation of instability in her knee, particularly during  "lateral movements. She has been engaging in physical therapy, focusing on muscle development at the hips and core. She expresses concern about potential hyperextension of her knee. She reports no swelling in the knee. She occasionally experiences instability when stepping on stairs. She has been using a weighted vest for exercise but reduced the weight from 12 to 6 pounds to avoid overloading her knee. Despite this, she noticed discomfort in her left knee during walks. She has been wearing a brace daily but not during sleep. She is uncertain about the effectiveness of Aleve as she also received a massage after taking it. She recalls a similar incident of knee popping followed by soreness lasting three days, which resolved with Tylenol or Advil. She reports occasional locking of the knee, which she believes is due to swelling. She reports no change in her calf symptoms but does not believe they have worsened. She feels she has not been able to properly address her calf issues due to limitations from her left knee pain and associated symptoms.    MEDICATIONS  Current: Aleve    SOCIAL   at [facility]       Temp 98.2 °F (36.8 °C) (Infrared)   Ht 153.7 cm (60.5\")   Wt 49.4 kg (109 lb)   BMI 20.94 kg/m²        Physical Exam  MSK Exam:  There is mild swelling at the left knee. The patella is without tenderness. Apprehension is negative with medial and lateral glide.  The medial joint line remains tender. Lateral joint line is nontender and without bony crepitus or step-off. Flexion and extension are slightly limited on the left and painful at end range. Knee strength is 4/5 but without significant pain. Significant pain with Eber's.     Assessment and Plan  Diagnoses and all orders for this visit:    1. Acute pain of left knee (Primary)  -     MRI Knee Left Without Contrast; Future    2. Chronic knee instability, left  -     MRI Knee Left Without Contrast; Future    3. Medial joint line tenderness of " knee, left  -     MRI Knee Left Without Contrast; Future    4. Positive Eber test of left knee, subsequent encounter  -     MRI Knee Left Without Contrast; Future      Charmaine is a 59 y.o. year old female here today for follow-up of bilateral lower leg pain that has been present for several years with recent worsening a couple months ago with no known injury or trauma. Evaluation with x-rays of the tib-fib and NIKHIL have been unremarkable. Initial history and exam seemed like symptoms were more muscular in nature with possible claudication contributing to symptoms as well. I recommended conservative management with physical therapy and regular use of compression stockings.    She is also here to follow-up acute left knee pain that has been present since the end of September during a tennis injury.  Initial x-rays showed mild medial and PF narrowing and spurring, but no acute findings.  Exam concerning for possible meniscus injury.  I recommended trial of conservative management.  Assessment & Plan  1. Left knee pain.  The symptoms suggest a potential meniscal injury, although there is also evidence of mild arthritis. Given the sudden onset of symptoms and the presence of a painful popping sensation, the meniscus is the primary concern. A conservative approach involving a steroid injection to alleviate inflammation and pain was discussed. This could potentially enhance progress with physical therapy and strengthen the hips. However, an MRI would provide a definitive diagnosis and guide further treatment decisions. She expressed a preference for obtaining more information before proceeding with injections. MRI of the left knee will be ordered to further evaluate. She may continue with low-impact and straight-line activities such as walking or jogging, and to avoid tennis due to its high demand for pivoting and lateral movement. She was also advised to refrain from using the weight vest. May continue with conservative  measures, including OTC medications, as needed. We will follow-up after MRI and make additional treatment recommendations accordingly.  All of her questions were answered and she is agreeable with the plan.    Dictated utilizing Dragon dictation.  Patient or patient representative verbalized consent for the use of Ambient Listening during the visit with  Sharon Broderick DO for chart documentation. 12/13/2024  08:49 EST

## 2024-12-17 NOTE — PROGRESS NOTES
[facility] [place]  [address] [address]                                                                                Phone [phone]                                                                                  Fax [phone]    Physical Therapy Reassessment/Re-evaluation  Patient: Charmaine Pierre   : 1965  Diagnosis/ICD-10 Code:  Bilateral calf pain [M79.661, M79.662]  Referring practitioner: Sharon Broderick DO  NPI: [id number]                                      Date of Initial Visit:  Type: THERAPY  Noted: 10/31/2024  Today's Date: 2024  Patient seen for 4 sessions         Visit Diagnoses:    ICD-10-CM ICD-9-CM   1. Bilateral calf pain  M79.661 729.5    M79.662    2. Impaired functional mobility and activity tolerance  Z74.09 V49.89   3. Acute pain of left knee  M25.562 719.46         Subjective Questionnaire: LEFS:   Clinical Progress: improved  Home Program Compliance: Yes  Treatment has included: therapeutic exercise, neuromuscular re-education, manual therapy, therapeutic activity, and cryotherapy      Subjective   Charmaine Pierre reports: I was feeling much better and then when standing at the top of my steps a couple of weeks ago and when I lifted my R leg to move a shopping bag and felt a pop with associated pain in my L knee. Returned to the ortho and she thinks that I might have a meniscal injury and has ordered a MRI.  It's been a 100 days since I've played tennis and would really like to resume.  I able to do yoga but not all poses. No locking but have had some giving way.  L Knee feels vulnerable.  Still pain with sleeping.  Pain is positional and intermittent.  Pain rated 3-4/10.  I'm able to walk 3-4 days/week for 30-45 min but notice the calf tightness/ache after about 5 min.  I've been able to tolerate PT exericses but the side plank does put some pressure on my L knee.     Objective          Observations   Left Knee  "  Negative for effusion.       Tenderness   Left Knee   No tenderness in the medial joint line, medial retinaculum, patellar tendon and pes anserinus.     Additional Tenderness Details  Level ASIS    Active Range of Motion   Left Knee   Flexion: 142 degrees   Extension: 0 degrees     Strength/Myotome Testing     Left Hip   Planes of Motion   Flexion: 4+  Extension: 5  Abduction: 5  Adduction: 4    Right Hip   Planes of Motion   Flexion: 4+  Extension: 5  Abduction: 5  Adduction: 4    Left Knee   Flexion: 4+  Extension: 4+  Quadriceps contraction: good    Right Knee   Flexion: 5  Extension: 5    Tests     Left Hip   Positive Rain.     Right Hip   Positive Rain.     Left Knee   Negative bounce home (negative) and medial Eber (guarded).     Ambulation   Weight-Bearing Status   Assistive device used: none    Observational Gait   Gait: within functional limits     Functional Assessment     Forward Step Up 8\"   Left Leg  No pain.     Forward Step Down 8\"   Left Leg  No pain.     Single Leg Stance   Left: 30 seconds  Right: 30 seconds    Comments  Pt is able to sit to stand from chair without pain or UE assist          Assessment/Plan  Charmaine returns to PT after a flare up of her L knee indicative of a possible medial meniscal tear and is awaiting a MRI.  She is no longer tender along her medial knee though was provoked/guarded with meniscal testing.  Thankfully she is tolerating her exercises well and demonstrated good balance/stair tolerance.  I updated/modified her exercises to build strength in sagittal and frontal planes while reducing stress on her knee.  I educated her on self activation technique to facilitate core stability and provided handouts.  I suggested she be more upright for her lateral band walks and she was able to progress to RDLs with light weight and good form. For now, I recommended that she hold off on side planks and instead perform sidelying hip abd with light ankle weights. She was also able to " use weights with SLR but had mild L pes anserinus soreness when in hip external rotation so advised her to be cautious with use of weights.  Continue to use ice after ice to address inflammation.      Goals  Plan Goals: STG In 4 weeks  1. Pt to exhibit compliance/independence with HEP without exacerbation of symptoms. - MET  2. Pt to exhibit increased L knee flex AROM to 135 to allow for improved ability to perform daily yoga practice. - MET  3.  Patient to ambulate independently community distances with normal elena and symmetry - MET  4. Patient will be independent with education for symptom management, joint protection and strategies to minimize stress on affected tissues - MET    LTG In 8 weeks  1. Pt to exhibit symmetrical iliosacral alignment on 2 consecutive appointments to allow improved tolerance to fitness walking - MET  2. LEFS >/= 60/80 to demonstrate improved functional tolerance to ADLs - ONGOING  3. Pt to exhibit 5/5 strength throughout hip and knee musculature to allow for normalized gait and standing > 10 min. - ONGOING  4. Pt is able to resume tennis at least 2 days/week without functional limitation and acceptable symptoms - ONGOING  5. Pt is able to ambulate for at least 25 min/5 days a week in her neighborhood without pain > 2/10 - ONGOING     Recommendations: Continue with recommendations avoid pivoting movements and add light ankle weights to leg raises if tolerated. Awaiting MRI   Timeframe:  4-6 weeks  Prognosis to achieve goals: good      Timed:         Manual Therapy:   10      mins  28196;     Therapeutic Exercise:    15     mins  70882;     Neuromuscular Letha:  10      mins  04605;    Therapeutic Activity:    15      mins  20281;     Gait Training:           mins  10804;     Ultrasound:          mins  24539;    Ionto                                   mins   40032  Self Care                            mins   42696    Un-Timed:  Electrical Stimulation:         mins  17284 (  );  Dry Needling   (1-2 muscles)       mins 20560 (Self-pay)  Dry Needling (3-4 muscles)        mins 20561 (Self-pay)  Dry Needling Trial          mins DRYNDLTRIAL  (No Charge)  Traction          mins 33046  Re-Eval                        15       mins  92104      Timed Treatment:  50    mins   Total Treatment:    65    mins          PT: Alma Rosa Magana PT     [place] License:  [id number]    Electronically signed by Alma Rosa Magana PT, 12/19/2024,11:44 AM EST    Certification Period: 12/19/2024 thru 3/18/2025  I certify that the therapy services are furnished while this patient is under my care.  The services outlined above are required by this patient, and will be reviewed every 90 days.         Physician Signature:__________________________________________________    PHYSICIAN: Sharon Broderick DO  NPI: [id number]                                      DATE:  :     Please sign and return via fax to [phone].  Thank you, [facility]

## 2024-12-19 ENCOUNTER — TREATMENT (OUTPATIENT)
Dept: PHYSICAL THERAPY | Facility: CLINIC | Age: 59
End: 2024-12-19
Payer: COMMERCIAL

## 2024-12-19 DIAGNOSIS — M79.661 BILATERAL CALF PAIN: Primary | ICD-10-CM

## 2024-12-19 DIAGNOSIS — Z74.09 IMPAIRED FUNCTIONAL MOBILITY AND ACTIVITY TOLERANCE: ICD-10-CM

## 2024-12-19 DIAGNOSIS — M25.562 ACUTE PAIN OF LEFT KNEE: ICD-10-CM

## 2024-12-19 DIAGNOSIS — M79.662 BILATERAL CALF PAIN: Primary | ICD-10-CM

## 2025-01-16 ENCOUNTER — TELEPHONE (OUTPATIENT)
Dept: SPORTS MEDICINE | Facility: CLINIC | Age: 60
End: 2025-01-16

## 2025-01-16 NOTE — TELEPHONE ENCOUNTER
Called and spoke to patient.  She reports an improvement in her symptoms and feels that she is rounded a corner over the past month and is happy with her progress.  We discussed symptoms typically associated with meniscus injury, and she currently is denying mechanical symptoms.  Discussed utility of proceeding with the MRI, including potential findings based on history and exam and treatment course based on potential findings.  Again, she states that she is currently happy with her progress and will likely hold off on obtaining the MRI just worsening.  I discussed a plan for gradually increasing her activity level as able and tolerated.  Will follow-up in the next few weeks or sooner as needed.

## 2025-01-16 NOTE — TELEPHONE ENCOUNTER
Caller: DELANO OTTO     Phone Number: [phone] (home)     Reason for Call:   PATIENT HAS AN APPOINTMENT ON 1-20-25 FOR MRI OF LEFT KNEE. INJURY HAPPENED 130 DAYS AGO AND FEELING BETTER DOES SHE STILL NEED TO GO TO IMAGING APPOINTMENT.   WOULD LIKE CALL BACK FROM DR. ALDANA

## 2025-05-05 ENCOUNTER — OFFICE VISIT (OUTPATIENT)
Dept: SPORTS MEDICINE | Facility: CLINIC | Age: 60
End: 2025-05-05
Payer: COMMERCIAL

## 2025-05-05 VITALS — WEIGHT: 111 LBS | HEIGHT: 61 IN | RESPIRATION RATE: 16 BRPM | BODY MASS INDEX: 20.96 KG/M2

## 2025-05-05 DIAGNOSIS — M79.662 BILATERAL CALF PAIN: Primary | ICD-10-CM

## 2025-05-05 DIAGNOSIS — M79.661 BILATERAL CALF PAIN: Primary | ICD-10-CM

## 2025-05-05 PROCEDURE — 99215 OFFICE O/P EST HI 40 MIN: CPT | Performed by: STUDENT IN AN ORGANIZED HEALTH CARE EDUCATION/TRAINING PROGRAM

## 2025-05-15 ENCOUNTER — TREATMENT (OUTPATIENT)
Dept: PHYSICAL THERAPY | Facility: CLINIC | Age: 60
End: 2025-05-15
Payer: COMMERCIAL

## 2025-05-15 DIAGNOSIS — Z74.09 IMPAIRED FUNCTIONAL MOBILITY AND ACTIVITY TOLERANCE: ICD-10-CM

## 2025-05-15 DIAGNOSIS — M79.661 BILATERAL CALF PAIN: Primary | ICD-10-CM

## 2025-05-15 DIAGNOSIS — M79.662 BILATERAL CALF PAIN: Primary | ICD-10-CM

## 2025-05-15 NOTE — PROGRESS NOTES
"    Physical Therapy Initial Evaluation and Plan of Care    [address]  [facility] [address] [phone]  Patient: Charmaine Pierre   : 1965  Diagnosis/ICD-10 Code:  Bilateral calf pain [M79.661, M79.662]  Referring practitioner: Sharon Broderick DO  Date of Initial Visit: 5/15/2025  Today's Date: 5/15/2025  Patient seen for 1 session         Visit Diagnoses:    ICD-10-CM ICD-9-CM   1. Bilateral calf pain  M79.661 729.5    M79.662    2. Impaired functional mobility and activity tolerance  Z74.09 V49.89         Subjective Questionnaire: LEFS: 22.5% impairment      Subjective Evaluation    History of Present Illness  Mechanism of injury: B calf pain, R>L for years and left knee pain without specific injury.  Pt late last year with some help.  Is starting to be more active and pain in calves continues to be episodic - incapacitating at times.  EMG/NCV and other tests - knee and lumbar MRIs.  Has Hx of tailbone Fx 26 yrs ago and has length discrepancy.  Several years ago \"rolled R ankle\". Not currently wearing a lift but does wear orthotics in certain shoes.      Patient Occupation: yoga, tennis, light weight training upper body; walks, yard work Pain  Current pain ratin  At worst pain ratin  Quality: tremendous ache.  Aggravating factors: prolonged positioning (incline walk; intense tennis match; prlonged walk)    Diagnostic Tests  EMG: normal    Patient Goals  Patient goals for therapy: decreased pain and return to sport/leisure activities  Patient goal: get legs more healthy           Objective          Tenderness   Left Ankle/Foot   No tenderness.     Right Ankle/Foot   No tenderness.     Active Range of Motion   Left Knee   Normal active range of motion    Right Knee   Normal active range of motion    Strength/Myotome Testing     Left Hip   Planes of Motion   Flexion: 4+  Abduction: 5  Adduction: 5  External rotation: 4+    Right Hip   Planes of Motion   Flexion: 4+  Abduction: " 5  Adduction: 5  External rotation: 4+    Left Knee   Flexion: 5  Extension: 5    Right Knee   Flexion: 5  Extension: 5    Left Ankle/Foot   Dorsiflexion: 5  Plantar flexion: 5  Inversion: 5  Eversion: 4+    Right Ankle/Foot   Dorsiflexion: 5  Plantar flexion: 5  Inversion: 5  Eversion: 4+    Tests   Left Ankle/Foot   Negative for Martinez.     Right Ankle/Foot   Negative for Martinez.     Additional Tests Details  Negative instability    Right Hip Flexibility Comments:   Tight ITB and piriformis B    Ambulation     Ambulation: Level Surfaces   Ambulation without assistive device: independent    Additional Level Surfaces Ambulation Details  Normal gait          Assessment & Plan       Assessment  Impairments: activity intolerance, impaired physical strength, lacks appropriate home exercise program and pain with function   Assessment details: Charmaine Pierre is 59 y.o. female who presents today to Physical Therapy for chronic intermittent B calf pain. Also with Hx of left knee pain, sacral fx years ago with long-standing issues.  Is very physically active including tennis but has had to curtail her activities significantly at times due to these issues.  Is wanting to start becoming more active again. She presents with limited B hip flexibility and strength and weakness with ankle eversion. Patient would benefit from skilled physical therapy to address functional limitations and impairments to improve quality of life and return to functional activities.  Prognosis: good  Prognosis details: motivated    Goals  Plan Goals: STGs x 2 wks  1. Decreased pain with WB ADLs to < 6/10  2. Demonstrates tolerance for and compliance with initial HEP  3. Tolerates progression to WB and gait/balance activities  4. Review joint protection principles with ADLs/sport    LTGs x 6 wks  1. Independent with HEP for continued maintenance and prevention  2. Ambulates level surface throughout session and 6-8 inch steps with normal gait  3. Pain  < 3/10 with ADLs  4. MMT 5/5 hip and ankle  5. Improved function per LEFS score of < 15%    Plan  Therapy options: will be seen for skilled therapy services  Planned modality interventions: cryotherapy and thermotherapy (hydrocollator packs)  Planned therapy interventions: manual therapy, soft tissue mobilization, therapeutic activities, stretching, strengthening, home exercise program, functional ROM exercises and neuromuscular re-education  Frequency: 1x week  Duration in weeks: 6  Treatment plan discussed with: patient        History # of Personal Factors and/or Comorbidities: MODERATE (1-2)  Examination of Body System(s): # of elements: LOW (1-2)  Clinical Presentation: STABLE   Clinical Decision Making: LOW       Timed:         Manual Therapy:    0     mins  96528;     Therapeutic Exercise:    14     mins  52715;     Neuromuscular Letha:    0    mins  78052;    Therapeutic Activity:     10     mins  01611;     Gait Trainin     mins  97302;     Ultrasound:     0     mins  77128;    Ionto                               0    mins   93774  Self Care                       0     mins   59849      Un-Timed:  Electrical Stimulation:    0     mins  10028 ( );  Dry Needling     0     mins self-pay  Traction     0     mins 07309  Low Eval     23     Mins  67613  Mod Eval     0     Mins  23778  High Eval                       0     Mins  64696  Canalith Repos    0     mins 09674      Timed Treatment:   24   mins   Total Treatment:     47   mins          PT: Margie Carty PT     License Number: [id number]  Electronically signed by Margie Carty PT, 05/15/25, 9:01 AM EDT    Certification Period: 5/15/2025 thru 2025  I certify that the therapy services are furnished while this patient is under my care.  The services outlined above are required by this patient, and will be reviewed every 90 days.         Physician Signature:__________________________________________________    PHYSICIAN: Sharon Broderick  DO  NPI: [id number]                                      DATE:      Please sign and return via fax to [email or web address] . Thank you, [facility].

## 2025-05-27 NOTE — PROGRESS NOTES
Chief Complaint   Patient presents with    Left Knee - Pain, Initial Evaluation     Pt injured left knee.     Leg Pain     Pt being seen for leg pain, both legs.        History of Present Illness  Charmaine is a 59 y.o. year old female here today for follow-up of bilateral lower leg pain that has been present for several years with recent worsening mid 2024 with no known injury or trauma. Evaluation with x-rays of the tib-fib and NIKHIL have been unremarkable. Initial history and exam seemed like symptoms were more muscular in nature with possible claudication contributing to symptoms as well. I recommended conservative management with physical therapy and regular use of compression stockings.    She is also here to follow-up acute left knee pain that has been present since the end of September during a tennis injury.  Initial x-rays showed mild medial and PF narrowing and spurring, but no acute findings.  Exam concerning for possible meniscus injury.  I recommended trial of conservative management. Please see previous notes for complete history and treatment course. She returns today for her follow-up visit since December with persistent symptoms.     History of Present Illness  She has resumed playing tennis once a week after a six-month hiatus. However, she experiences immediate calf discomfort during and after the game, which persists throughout the day. The pain is described as an ache rather than a sharp sensation, with no associated numbness or tingling.  She has undergone an EMG and Doppler ultrasound, both of which were normal. She has tried cupping, needling, and other non-medical interventions, as well as medical treatments in 2020 and 2023.    She also mentions a past injury to her right ankle, which has not fully recovered despite yoga and stretching exercises. Occasionally, her ankle locks when descending stairs. She has a history of frostbite in her toes from childhood, resulting in cold toes but generally  good circulation. She is right-handed and plays tennis with her right hand. She has been performing physical therapy exercises at home three times a week and walking daily. She also engages in lawn mowing and bush trimming activities. She has been doing yoga and stretching exercises.    She has a history of tailbone fracture during childbirth and feels she has been compensating for it throughout her life. Recently, she has noticed a slight inward turn of her shoulder.    SOCIAL HISTORY  Occupations:  at [employer]  Exercise: Plays tennis once a week, walks most days, performs physical therapy exercises three times a week    MEDICATIONS  Current: Aleve     The following data was reviewed by: Sharon Broderick DO on 10/17/2024:  Data reviewed : Primary Care note from 9/18/24, 6/5/23     Right Tib-Fib X-Ray from 6/5/23  Indication: Pain  AP and Lateral views  Findings:  No fracture  Small well-rounded ossicles distal to the tip of both the medial and lateral malleolus  Normal soft tissues  Normal joint spaces  No prior studies were available for comparison.         Results for orders placed during the hospital encounter of 06/26/23    MRI Lumbar Spine Without Contrast    Narrative  MRI EXAMINATION OF THE LUMBAR SPINE WITHOUT CONTRAST    HISTORY: Back pain, left radiculopathy.    COMPARISON: None.    FINDINGS: The alignment of the lumbar spine is within normal limits.  There is very mild disc desiccation with preservation of disc height  from L1 to L5. The conus is at L1 and the caudal aspect of the spinal  cord appears unremarkable.    L1-L2: There is no evidence of disc bulge or herniation.    L2-L3: There is no evidence of disc bulge or herniation.    L3-L4: Mild facet degenerative disease and a minimal central disc  osteophyte complex are present.    L4-L5: Mild facet degenerative disease and a mild central broad-based  disc osteophyte complex are appreciated. There is mild lateral recess  narrowing  "bilaterally, more prominent on the left. Minimal foraminal  stenosis is appreciated bilaterally secondary to extension of a small  disc osteophyte complex into the neural foramen.    L5-S1: There is no evidence of disc bulge or herniation.    Impression  Mild degenerative disease involving the lumbar spine is  noted as described above with no evidence of disc herniation. This  includes mild facet degenerative disease and a mild broad-based disc  osteophyte complex at L4-L5 which together contribute to mild lateral  recess narrowing bilaterally, more prominent on the left. See above.    This report was finalized on 6/27/2023 4:44 PM by Dr. Speedy Bryant M.D.        Noninvasive physiologic studies of upper/lower extremity arteries, single level, bilateral from 10/30/2020  Clinical Indication    CLOTICATION   Dx: Atherosclerosis of native artery of both lower extremities with intermittent claudication [I70.213 (ICD-10-CM)]     Interpretation Summary  Right Conclusion: The right NIKHIL is normal. Normal digital pressures.  Left Conclusion: The left NIKHIL is normal. Normal digital pressures.     Study Impression  Right Conclusion: The right NIKHIL is normal. Normal digital pressures.     Left Conclusion: The left NIKHIL is normal. Normal digital pressures.     Signed  Electronically signed by Derian Vargas MD on 10/30/20 at 0947 EDT     Resp 16   Ht 154.9 cm (61\")   Wt 50.3 kg (111 lb)   BMI 20.97 kg/m²        Physical Exam  MSK Exam:  The bilateral knees and lower legs are without signs of swelling, erythema, ecchymosis, or effusion.  The medial and lateral joint lines are nontender and without bony crepitus or step-off. Flexion and extension are WNL and symmetrical. Knee strength is 4/5 but symmetrical and without significant pain. No soft tissue tenderness of the lower legs. No pain with stretching of the gastrocnemius and soleus muscles. Right calf feels somewhat less dense and softer compared to the left calf. No " pain with forced passive DF. Ankle PF and DF strength is symmetrical and pain-free.    Assessment and Plan  Diagnoses and all orders for this visit:    1. Bilateral calf pain (Primary)  -     Ambulatory Referral to Physical Therapy for Evaluation & Treatment      Charmaine is a 59 y.o. year old female here today for follow-up of bilateral lower leg pain that has been present for several years with recent worsening mid 2024 with no known injury or trauma. Evaluation with x-rays of the tib-fib and NIKHIL have been unremarkable. Initial history and exam seemed like symptoms were more muscular in nature with possible claudication contributing to symptoms as well. I recommended conservative management with physical therapy and regular use of compression stockings. She presented with acute worsening of left knee pain that was concerning for potential meniscal injury, so that became the primary focus. She returns today for her follow-up visit since December with persistent calf symptoms.   Assessment & Plan  1. Calf pain:    The symptoms do not appear to be muscular in nature, as I would have expected greater improvement with such a prolonged period of rest, as also symptoms are not provoked on exam.  Given that she is a female with no history of smoking and no significant CV disease, chance of PVD related symptoms is low. The possibility of exertional compartment syndrome vs neurogenic claudications was discussed. I recommended that she maintain a log of symptoms, noting the onset, duration, and resolution of pain and symptoms during various activities such as walking or playing tennis. A referral for physical therapy with Dorina Maya was provided, focusing on calf stretching and strengthening exercises. Discussed possible compound cream, but she will monitor symptoms first and then decide if she would like to try medication.     All of her questions were answered and she is agreeable with the plan.    Total time: 45 minutes.  This includes time spent with the patient, but also time spent before the visit reviewing the chart and time after the visit documenting the visit, reviewing labs, imaging studies, etc. This is in addition to/separate from any documented procedures performed.       Dictated utilizing Dragon dictation.  Patient or patient representative verbalized consent for the use of Ambient Listening during the visit with  Sharon Broderick DO for chart documentation. 5/5/2024  14:57 EST

## 2025-05-28 ENCOUNTER — TREATMENT (OUTPATIENT)
Dept: PHYSICAL THERAPY | Facility: CLINIC | Age: 60
End: 2025-05-28
Payer: COMMERCIAL

## 2025-05-28 DIAGNOSIS — M79.661 BILATERAL CALF PAIN: Primary | ICD-10-CM

## 2025-05-28 DIAGNOSIS — M79.662 BILATERAL CALF PAIN: Primary | ICD-10-CM

## 2025-05-28 DIAGNOSIS — Z74.09 IMPAIRED FUNCTIONAL MOBILITY AND ACTIVITY TOLERANCE: ICD-10-CM

## 2025-05-28 PROCEDURE — 97112 NEUROMUSCULAR REEDUCATION: CPT

## 2025-05-28 PROCEDURE — 97530 THERAPEUTIC ACTIVITIES: CPT

## 2025-05-28 PROCEDURE — 97110 THERAPEUTIC EXERCISES: CPT

## 2025-05-28 NOTE — PROGRESS NOTES
Physical Therapy Daily Progress Note                                            [address] [facility] [address] [facility], [address]                                                           [place], [place]                                                         [phone]    Patient: Charmaine Pierre   : 1965  Diagnosis/ICD-10 Code:  Bilateral calf pain [M79.661, M79.662]  Referring practitioner: Sharon Broderick DO  Date of Initial Visit: Type: THERAPY  Noted: 5/15/2025  Today's Date: 2025  Patient seen for 2 sessions           Subjective Evaluation    History of Present Illness    Subjective comment: Charmaine reports no change in her bilateral calf pain. She describes the pain as an ache that comes on with increased activity. She went hiking [place] which brought on her pain       Objective     See Exercise, Manual, and Modality Logs for complete treatment.       Assessment & Plan       Assessment  Assessment details: Due to persisting complaints of bilateral calf pain, time was spent addressing bilateral ankles. Chramaine tolerated addition of stretching for her gastroc and soleus as well as strengthening for her gastroc. Combination movements including heel raises with inversion and heel raises with eversion were also added to bring in strengthening for her posterior tib and peroneals, respectively. These exercises were given to improve bilateral ankle stability. When first performing standing heel raises, Charmaine had a tendency to allow her L ankle to move into supination, likely due to poor strength of her posterior tib, however, she was able to correct with verbal/ visual cueing. Plan to continue progressing all exercises as tolerated.         Progress per Plan of Care           Manual Therapy:    0     mins  95277;  Therapeutic Exercise:    20     mins  33517;     Neuromuscular Letha:    10    mins  86924;    Therapeutic Activity:     11     mins  34334;     Gait Trainin     mins  06785;     Ultrasound:      0     mins  93259;    Electrical Stimulation:    0     mins  81041 ( );  Dry Needling     0     mins self-pay    Timed Treatment:   41   mins   Total Treatment:     41   mins    Anthony Petty PT, DPT  Physical Therapist  [place] License #: [id number]  Anthony Petty PT, 05/28/25, 8:31 AM EDT

## 2025-06-09 ENCOUNTER — TRANSCRIBE ORDERS (OUTPATIENT)
Dept: ADMINISTRATIVE | Facility: HOSPITAL | Age: 60
End: 2025-06-09
Payer: COMMERCIAL

## 2025-06-09 DIAGNOSIS — Z12.31 BREAST CANCER SCREENING BY MAMMOGRAM: Primary | ICD-10-CM

## 2025-06-11 ENCOUNTER — TREATMENT (OUTPATIENT)
Dept: PHYSICAL THERAPY | Facility: CLINIC | Age: 60
End: 2025-06-11
Payer: COMMERCIAL

## 2025-06-11 DIAGNOSIS — M79.662 BILATERAL CALF PAIN: Primary | ICD-10-CM

## 2025-06-11 DIAGNOSIS — M79.661 BILATERAL CALF PAIN: Primary | ICD-10-CM

## 2025-06-11 DIAGNOSIS — Z74.09 IMPAIRED FUNCTIONAL MOBILITY AND ACTIVITY TOLERANCE: ICD-10-CM

## 2025-06-11 PROCEDURE — 97112 NEUROMUSCULAR REEDUCATION: CPT

## 2025-06-11 PROCEDURE — 97110 THERAPEUTIC EXERCISES: CPT

## 2025-06-11 NOTE — PROGRESS NOTES
Physical Therapy Daily Progress Note                                            [address], [address]                                                           [place], [place]                                                         [phone]    Patient: Charmaine Pierre   : 1965  Diagnosis/ICD-10 Code:  Bilateral calf pain [M79.661, M79.662]  Referring practitioner: Sharon Broderick DO  Date of Initial Visit: Type: THERAPY  Noted: 5/15/2025  Today's Date: 2025  Patient seen for 3 sessions           Subjective Evaluation    History of Present Illness    Subjective comment: Charmaine reports that she has not been a good student over the past two weeks because she has been traveling. She performed her HEP maybe 3 times. She did play tennis on two separate days and had onset of calf pain later on- several hours later       Objective   See Exercise, Manual, and Modality Logs for complete treatment.       Assessment & Plan       Assessment  Assessment details: Time spent going through Charmaine's current HEP as she has been unable to consistently perform it independently. She demonstrates good form with all exercises. She was educated on importance of maintaining a neutral ankle and avoiding supination while performing heel raises, and she displayed great retention of this. Charmaine displayed continued ankle instability, however, while performing heel raises as her bilateral ankles wanted to supinate. She was able to correct independently. Ankle inversion strengthening was added to improve overall ankle stability. Charmaine will be seen back in 2 weeks for re-assessment.         Progress per Plan of Care           Manual Therapy:    0     mins  56528;  Therapeutic Exercise:    31     mins  85251;     Neuromuscular Letha:    10    mins  62528;    Therapeutic Activity:     0     mins  23272;     Gait Trainin     mins  99391;     Ultrasound:     0     mins  06255;    Electrical Stimulation:    0     mins   82365 ( );  Dry Needling     0     mins self-pay    Timed Treatment:   41   mins   Total Treatment:     41   mins    Anthony Petty PT, DPT  Physical Therapist  [place] License #: [id number]  Anthony Petty PT, 06/11/25, 9:07 AM EDT

## 2025-06-17 ENCOUNTER — TELEPHONE (OUTPATIENT)
Dept: SPORTS MEDICINE | Facility: CLINIC | Age: 60
End: 2025-06-17
Payer: COMMERCIAL

## 2025-06-17 NOTE — TELEPHONE ENCOUNTER
Patient called stating that Dr Broderick asked her to call and give an update as to her progress since her last visit. Patient left a voicemail on the nurse line at 1300. Patient states she is available from 1300 to 1530, her number is [phone]. Please advise

## 2025-07-24 ENCOUNTER — TELEPHONE (OUTPATIENT)
Dept: SPORTS MEDICINE | Facility: CLINIC | Age: 60
End: 2025-07-24
Payer: COMMERCIAL

## 2025-07-24 NOTE — TELEPHONE ENCOUNTER
Patient was returning call on 7/22/2025 to speak with Dr Broderick about her knee. Please call patient at [phone]

## 2025-07-31 ENCOUNTER — HOSPITAL ENCOUNTER (OUTPATIENT)
Dept: MAMMOGRAPHY | Facility: HOSPITAL | Age: 60
Discharge: HOME | End: 2025-07-31
Admitting: FAMILY MEDICINE
Payer: COMMERCIAL

## 2025-07-31 DIAGNOSIS — Z12.31 BREAST CANCER SCREENING BY MAMMOGRAM: ICD-10-CM

## 2025-07-31 PROCEDURE — 77063 BREAST TOMOSYNTHESIS BI: CPT

## 2025-07-31 PROCEDURE — 77067 SCR MAMMO BI INCL CAD: CPT
